# Patient Record
Sex: MALE | Race: WHITE | NOT HISPANIC OR LATINO
[De-identification: names, ages, dates, MRNs, and addresses within clinical notes are randomized per-mention and may not be internally consistent; named-entity substitution may affect disease eponyms.]

---

## 2019-06-04 ENCOUNTER — APPOINTMENT (OUTPATIENT)
Dept: OTOLARYNGOLOGY | Facility: CLINIC | Age: 39
End: 2019-06-04

## 2019-06-05 ENCOUNTER — APPOINTMENT (OUTPATIENT)
Dept: OTOLARYNGOLOGY | Facility: CLINIC | Age: 39
End: 2019-06-05
Payer: COMMERCIAL

## 2019-06-05 VITALS
DIASTOLIC BLOOD PRESSURE: 61 MMHG | HEART RATE: 48 BPM | HEIGHT: 70 IN | BODY MASS INDEX: 25.77 KG/M2 | SYSTOLIC BLOOD PRESSURE: 105 MMHG | WEIGHT: 180 LBS

## 2019-06-05 DIAGNOSIS — Z78.9 OTHER SPECIFIED HEALTH STATUS: ICD-10-CM

## 2019-06-05 DIAGNOSIS — H61.22 IMPACTED CERUMEN, LEFT EAR: ICD-10-CM

## 2019-06-05 PROCEDURE — 92557 COMPREHENSIVE HEARING TEST: CPT

## 2019-06-05 PROCEDURE — 92550 TYMPANOMETRY & REFLEX THRESH: CPT

## 2019-06-05 PROCEDURE — 92593: CPT | Mod: NC

## 2019-06-05 PROCEDURE — 99213 OFFICE O/P EST LOW 20 MIN: CPT | Mod: 25

## 2019-06-05 PROCEDURE — 69210 REMOVE IMPACTED EAR WAX UNI: CPT

## 2019-06-05 NOTE — ASSESSMENT
[FreeTextEntry1] : Significant hearing loss currently using bilateral amplification. Followup with audiologist for adjustments as needed. Annual followup for audiometric surveillance.

## 2019-06-05 NOTE — HISTORY OF PRESENT ILLNESS
[de-identified] : TRACY GARCIA has a history of Bilateral hearing loss detected in middle school in routine hearing screening. Followed for many years. Using bilateral amplification since approximately 2009. No history of childhood ear infections. No known family history of premature hearing loss.\par \par CT scan of the temporal bone 2009: Normal\par \par  [FreeTextEntry1] : 06/05/2019 Routine followup for bilateral hearing loss. Using bilateral amplification with good benefit. No perceived change in hearing reported. Some tinnitus noted.

## 2019-06-05 NOTE — PHYSICAL EXAM
[Normal] : mucosa is normal [Midline] : trachea located in midline position [FreeTextEntry1] : Microscopic ear exam with cerumen debridement:\par \par Right ear: The ear canal was patent and nonobstructed.  The tympanic membrane was intact and noninflamed.\par \par Left ear: Obstructing cerumen was debrided from the ear canal using suction, and curet.  The ear canal was otherwise within normal limits.  The tympanic membrane was intact and noninflamed.

## 2019-06-05 NOTE — REASON FOR VISIT
[Subsequent Evaluation] : a subsequent evaluation for [FreeTextEntry2] : Routine Follow up for Bilateral Hearing Loss

## 2019-09-27 ENCOUNTER — APPOINTMENT (OUTPATIENT)
Dept: OTOLARYNGOLOGY | Facility: CLINIC | Age: 39
End: 2019-09-27
Payer: SELF-PAY

## 2019-09-27 PROCEDURE — 92593: CPT | Mod: NC

## 2020-01-17 ENCOUNTER — APPOINTMENT (OUTPATIENT)
Dept: OTOLARYNGOLOGY | Facility: CLINIC | Age: 40
End: 2020-01-17
Payer: SELF-PAY

## 2020-01-17 PROCEDURE — 92593: CPT | Mod: NC

## 2020-04-29 ENCOUNTER — APPOINTMENT (OUTPATIENT)
Dept: OTOLARYNGOLOGY | Facility: CLINIC | Age: 40
End: 2020-04-29

## 2020-09-15 ENCOUNTER — APPOINTMENT (OUTPATIENT)
Dept: OTOLARYNGOLOGY | Facility: CLINIC | Age: 40
End: 2020-09-15
Payer: COMMERCIAL

## 2020-09-15 ENCOUNTER — APPOINTMENT (OUTPATIENT)
Dept: OTOLARYNGOLOGY | Facility: CLINIC | Age: 40
End: 2020-09-15
Payer: SELF-PAY

## 2020-09-15 PROCEDURE — 92557 COMPREHENSIVE HEARING TEST: CPT

## 2020-09-15 PROCEDURE — 92504 EAR MICROSCOPY EXAMINATION: CPT

## 2020-09-15 PROCEDURE — 92567 TYMPANOMETRY: CPT

## 2020-09-15 PROCEDURE — 99213 OFFICE O/P EST LOW 20 MIN: CPT | Mod: 25

## 2020-09-15 PROCEDURE — 92593: CPT | Mod: NC

## 2020-09-15 NOTE — CONSULT LETTER
[Please see my note below.] : Please see my note below. [FreeTextEntry1] : Thank you for allowing me to participate in the care of TRACY GARCIA .\par Please see the attached visit note.\par \par \par \par Juni Romano\par Otology\par Department of Otolaryngology\par [facility]  [FreeTextEntry2] : Dear FRANK JOSEPH AMICO

## 2020-09-15 NOTE — PHYSICAL EXAM
[Normal] : no rashes [FreeTextEntry1] : Procedure: Microscopic Ear Exam\par \par Left ear:  Ear canal intact without inflammation or lesion.  \par Tympanic membrane intact without inflammation.\par \par Right ear:  Ear canal intact without inflammation or lesion.  \par Tympanic membrane intact without inflammation.\par \par

## 2020-09-15 NOTE — ASSESSMENT
[FreeTextEntry1] : Procedure change in hearing despite fairly stable findings on audiometry. Speech reception threshold has increased. I have recommended followup with  audiologist for hearing aid adjustment. I have recommended moist protection and continued clinical monitoring.

## 2020-09-15 NOTE — HISTORY OF PRESENT ILLNESS
[de-identified] : TRACY GARCIA has a history of Bilateral hearing loss detected in middle school in routine hearing screening. Followed for many years. Using bilateral amplification since approximately 2009. No history of childhood ear infections. No known family history of premature hearing loss.\par \par CT scan of the temporal bone 2009: Normal\par \par  [FreeTextEntry1] : 09/15/2020 \par Routine followup for bilateral hearing loss. Perceived decline in the left ear for 1 month.  No precipitating event.  Using bilateral amplification with good benefit. Some tinnitus noted; mild.

## 2022-04-11 ENCOUNTER — APPOINTMENT (OUTPATIENT)
Dept: OTOLARYNGOLOGY | Facility: CLINIC | Age: 42
End: 2022-04-11
Payer: COMMERCIAL

## 2022-04-11 ENCOUNTER — APPOINTMENT (OUTPATIENT)
Dept: OTOLARYNGOLOGY | Facility: CLINIC | Age: 42
End: 2022-04-11
Payer: SELF-PAY

## 2022-04-11 DIAGNOSIS — H90.3 SENSORINEURAL HEARING LOSS, BILATERAL: ICD-10-CM

## 2022-04-11 DIAGNOSIS — H61.20 IMPACTED CERUMEN, UNSPECIFIED EAR: ICD-10-CM

## 2022-04-11 PROCEDURE — 99213 OFFICE O/P EST LOW 20 MIN: CPT | Mod: 25

## 2022-04-11 PROCEDURE — 92567 TYMPANOMETRY: CPT

## 2022-04-11 PROCEDURE — 69210 REMOVE IMPACTED EAR WAX UNI: CPT

## 2022-04-11 PROCEDURE — 92593: CPT | Mod: NC

## 2022-04-11 PROCEDURE — 92557 COMPREHENSIVE HEARING TEST: CPT

## 2022-04-11 NOTE — HISTORY OF PRESENT ILLNESS
[de-identified] : TRACY GARCIA has a history of Bilateral hearing loss detected in middle school in routine hearing screening. Followed for many years. Using bilateral amplification since approximately 2009. No history of childhood ear infections. No known family history of premature hearing loss.\par \par CT scan of the temporal bone 2009: Normal\par \par  [FreeTextEntry1] : 04/11/2022 \par No perceived change in hearing.  No tinnitus except in quiet. No vertigo or imbalance.

## 2022-04-11 NOTE — CONSULT LETTER
[Please see my note below.] : Please see my note below. [FreeTextEntry2] : Dear FRANK JOSEPH AMICO  [FreeTextEntry1] : Thank you for allowing me to participate in the care of TRACY GARCIA .\par Please see the attached visit note.\par \par \par \par Juni Romano\par Otology\par Medical Director of [facility]\par Department of Otolaryngology\par [facility]

## 2022-04-11 NOTE — ASSESSMENT
[FreeTextEntry1] : Ear hygiene reviewed in detail.  Follow up recommended if symptoms persist or progresses.  Routine follow up for cerumen management suggested.\par \par Stable bilateral hearing loss.  Reduced speech recognition noted today but was similar in 2018 testing.  I have discussed this with the patient in detail.  He has not noticed a change in hearing acuity or speech recognition.  I have recommended follow-up with his audiologist for further counseling.  Noise protection recommended.  Follow-up recommended annually and as needed.

## 2022-04-11 NOTE — DATA REVIEWED
[de-identified] : In light of the patients current symptoms, Complete audiometry was ordered and completed today. I have interpreted these results and reviewed them in detail with the patient.\par \par Today's testing is compared to prior testing.

## 2022-04-11 NOTE — PHYSICAL EXAM
[Normal] : temporomandibular joint is normal [FreeTextEntry1] : Microscopic ear exam with cerumen debridement:\par \par Right ear: Obstructing cerumen was debrided from the ear canal using suction, and curet.  The ear canal was otherwise within normal limits.  The tympanic membrane was intact and noninflamed.\par \par Left ear: Obstructing cerumen was debrided from the ear canal using suction, and curets.  The ear canal was otherwise within normal limits.  The tympanic membrane was intact and noninflamed.

## 2022-04-13 PROBLEM — H90.3 SENSORINEURAL HEARING LOSS (SNHL) OF BOTH EARS: Status: ACTIVE | Noted: 2019-06-05

## 2022-11-30 ENCOUNTER — APPOINTMENT (OUTPATIENT)
Dept: OTOLARYNGOLOGY | Facility: CLINIC | Age: 42
End: 2022-11-30

## 2024-06-21 ENCOUNTER — NON-APPOINTMENT (OUTPATIENT)
Age: 44
End: 2024-06-21

## 2024-07-29 ENCOUNTER — APPOINTMENT (OUTPATIENT)
Dept: OTOLARYNGOLOGY | Facility: CLINIC | Age: 44
End: 2024-07-29
Payer: COMMERCIAL

## 2024-07-29 ENCOUNTER — APPOINTMENT (OUTPATIENT)
Dept: OTOLARYNGOLOGY | Facility: CLINIC | Age: 44
End: 2024-07-29
Payer: SELF-PAY

## 2024-07-29 VITALS — HEIGHT: 70 IN | BODY MASS INDEX: 25.77 KG/M2 | WEIGHT: 180 LBS

## 2024-07-29 DIAGNOSIS — H90.3 SENSORINEURAL HEARING LOSS, BILATERAL: ICD-10-CM

## 2024-07-29 DIAGNOSIS — H61.20 IMPACTED CERUMEN, UNSPECIFIED EAR: ICD-10-CM

## 2024-07-29 PROCEDURE — 92567 TYMPANOMETRY: CPT

## 2024-07-29 PROCEDURE — 92557 COMPREHENSIVE HEARING TEST: CPT

## 2024-07-29 PROCEDURE — 99213 OFFICE O/P EST LOW 20 MIN: CPT | Mod: 25

## 2024-07-29 PROCEDURE — 92593: CPT | Mod: NC

## 2024-07-29 NOTE — ASSESSMENT
[FreeTextEntry1] : Ear hygiene reviewed in detail.  Follow up recommended if symptoms persist or progresses.  Routine follow up for cerumen management suggested.  Annual follow-up recommended for hearing reassessment.  Follow-up with audiologist for hearing technology optimization.

## 2024-07-29 NOTE — DATA REVIEWED
[de-identified] : Complete audiometry was ordered and completed today. This was separately reported by the audiologist. The results were reviewed in detail with the patient.

## 2024-07-29 NOTE — HISTORY OF PRESENT ILLNESS
[de-identified] :  TRACY GARCIA has a history of Bilateral hearing loss detected in middle school in routine hearing screening. Followed for many years. Using bilateral amplification since approximately 2009. No history of childhood ear infections. No known family history of premature hearing loss. [FreeTextEntry1] : 7/29/2024 Reports hearing is stable. Denies pain, pressure, tinnitus, dizziness.

## 2024-07-29 NOTE — CONSULT LETTER
[Please see my note below.] : Please see my note below. [FreeTextEntry2] : Dear FRANK JOSEPH AMICO  [FreeTextEntry1] : Thank you for allowing me to participate in the care of TRACY GARCIA . Please see the attached visit note.    Juni Romano Otology Medical Director of [facility] Department of Otolaryngology [facility]

## 2024-07-29 NOTE — PHYSICAL EXAM
[Normal] : mucosa is normal [Midline] : trachea located in midline position [FreeTextEntry1] : Microscopic ear exam with cerumen debridement:  Right ear: Obstructing cerumen was debrided from the ear canal using suction, and curet. The ear canal was otherwise within normal limits. The tympanic membrane was intact and noninflamed.  Left ear: Obstructing cerumen was debrided from the ear canal using suction, and curets. The ear canal was otherwise within normal limits. The tympanic membrane was intact and noninflamed.

## 2024-07-30 ENCOUNTER — TRANSCRIPTION ENCOUNTER (OUTPATIENT)
Age: 44
End: 2024-07-30

## 2024-11-07 ENCOUNTER — APPOINTMENT (OUTPATIENT)
Dept: OTOLARYNGOLOGY | Facility: CLINIC | Age: 44
End: 2024-11-07
Payer: SELF-PAY

## 2024-11-07 PROCEDURE — V5010 ASSESSMENT FOR HEARING AID: CPT | Mod: NC

## 2024-11-19 ENCOUNTER — APPOINTMENT (OUTPATIENT)
Dept: ORTHOPEDIC SURGERY | Facility: CLINIC | Age: 44
End: 2024-11-19
Payer: COMMERCIAL

## 2024-11-19 VITALS — HEIGHT: 70 IN | BODY MASS INDEX: 25.34 KG/M2 | WEIGHT: 177 LBS

## 2024-11-19 DIAGNOSIS — M79.18 MYALGIA, OTHER SITE: ICD-10-CM

## 2024-11-19 DIAGNOSIS — M75.41 IMPINGEMENT SYNDROME OF RIGHT SHOULDER: ICD-10-CM

## 2024-11-19 PROCEDURE — 73030 X-RAY EXAM OF SHOULDER: CPT | Mod: RT

## 2024-11-19 PROCEDURE — 73010 X-RAY EXAM OF SHOULDER BLADE: CPT | Mod: RT

## 2024-11-19 PROCEDURE — 99204 OFFICE O/P NEW MOD 45 MIN: CPT | Mod: 25

## 2024-11-19 PROCEDURE — 20611 DRAIN/INJ JOINT/BURSA W/US: CPT | Mod: RT

## 2024-11-19 PROCEDURE — J3490M: CUSTOM

## 2025-01-07 ENCOUNTER — APPOINTMENT (OUTPATIENT)
Dept: ORTHOPEDIC SURGERY | Facility: CLINIC | Age: 45
End: 2025-01-07